# Patient Record
Sex: FEMALE | Race: WHITE | URBAN - METROPOLITAN AREA
[De-identification: names, ages, dates, MRNs, and addresses within clinical notes are randomized per-mention and may not be internally consistent; named-entity substitution may affect disease eponyms.]

---

## 2021-11-29 ENCOUNTER — OFFICE VISIT (OUTPATIENT)
Dept: URBAN - METROPOLITAN AREA CLINIC 33 | Facility: CLINIC | Age: 71
End: 2021-11-29
Payer: MEDICARE

## 2021-11-29 DIAGNOSIS — H25.13 AGE-RELATED NUCLEAR CATARACT, BILATERAL: ICD-10-CM

## 2021-11-29 DIAGNOSIS — H52.223 REGULAR ASTIGMATISM, BILATERAL: ICD-10-CM

## 2021-11-29 DIAGNOSIS — E11.9 TYPE 2 DIABETES MELLITUS W/O COMPLICATION: Primary | ICD-10-CM

## 2021-11-29 DIAGNOSIS — H10.45 OTHER CHRONIC ALLERGIC CONJUNCTIVITIS: ICD-10-CM

## 2021-11-29 PROCEDURE — 99204 OFFICE O/P NEW MOD 45 MIN: CPT | Performed by: OPTOMETRIST

## 2021-11-29 RX ORDER — OLOPATADINE HYDROCHLORIDE OPHTHALMIC 2 MG/ML
0.2 % SOLUTION OPHTHALMIC
Qty: 5 | Refills: 3 | Status: ACTIVE
Start: 2021-11-29

## 2021-11-29 ASSESSMENT — KERATOMETRY
OD: 44.50
OS: 44.50

## 2021-11-29 ASSESSMENT — INTRAOCULAR PRESSURE
OS: 17
OD: 17

## 2021-11-29 ASSESSMENT — VISUAL ACUITY
OD: 20/25
OS: 20/20

## 2021-11-29 NOTE — IMPRESSION/PLAN
Impression: Type 2 diabetes mellitus w/o complication: L65.7. Plan: Diabetes w/o Complications: No signs of diabetic retinopathy noted. No treatment necessary at this time. Continue management with PCP.

## 2021-11-29 NOTE — IMPRESSION/PLAN
Impression: Age-related nuclear cataract, bilateral: H25.13. Plan: Cataracts account for the patient's complaints. No treatment currently recommended. Recommend patient updates glasses prescription.

## 2021-11-29 NOTE — IMPRESSION/PLAN
Impression: Other chronic allergic conjunctivitis: H10.45. Plan: Discussed condition with patient. Recommend patient start Olopatadine BID OU. RXd Olopatadine

## 2022-12-13 ENCOUNTER — OFFICE VISIT (OUTPATIENT)
Dept: URBAN - METROPOLITAN AREA CLINIC 33 | Facility: CLINIC | Age: 72
End: 2022-12-13
Payer: COMMERCIAL

## 2022-12-13 DIAGNOSIS — H10.45 OTHER CHRONIC ALLERGIC CONJUNCTIVITIS: ICD-10-CM

## 2022-12-13 DIAGNOSIS — E11.9 TYPE 2 DIABETES MELLITUS W/O COMPLICATION: Primary | ICD-10-CM

## 2022-12-13 DIAGNOSIS — H25.13 AGE-RELATED NUCLEAR CATARACT, BILATERAL: ICD-10-CM

## 2022-12-13 PROCEDURE — 99214 OFFICE O/P EST MOD 30 MIN: CPT | Performed by: OPTOMETRIST

## 2022-12-13 RX ORDER — OLOPATADINE HYDROCHLORIDE OPHTHALMIC 2 MG/ML
0.2 % SOLUTION OPHTHALMIC
Qty: 5 | Refills: 3 | Status: ACTIVE
Start: 2022-12-13

## 2022-12-13 ASSESSMENT — VISUAL ACUITY
OS: 20/30
OD: 20/25

## 2022-12-13 ASSESSMENT — INTRAOCULAR PRESSURE
OD: 15
OS: 15

## 2022-12-13 NOTE — IMPRESSION/PLAN
Impression: Other chronic allergic conjunctivitis: H10.45. Plan: Discussed condition with patient. Recommend patient continues with Olopatadine and AT's.

## 2022-12-13 NOTE — IMPRESSION/PLAN
Impression: Type 2 diabetes mellitus w/o complication: A62.3. Plan: Discussed diagnosis with patient. No signs of diabetic retinopathy. Treatment not necessary. Continue to work with PCP to keep BSL controlled.  Return yearly for Diabetic Exam.